# Patient Record
Sex: MALE | HISPANIC OR LATINO | Employment: FULL TIME | URBAN - METROPOLITAN AREA
[De-identification: names, ages, dates, MRNs, and addresses within clinical notes are randomized per-mention and may not be internally consistent; named-entity substitution may affect disease eponyms.]

---

## 2019-10-14 ENCOUNTER — OCCUPATIONAL HEALTH (OUTPATIENT)
Dept: URGENT CARE | Facility: CLINIC | Age: 34
End: 2019-10-14

## 2019-10-14 VITALS
TEMPERATURE: 97 F | BODY MASS INDEX: 28.45 KG/M2 | RESPIRATION RATE: 16 BRPM | DIASTOLIC BLOOD PRESSURE: 76 MMHG | WEIGHT: 177 LBS | HEART RATE: 69 BPM | HEIGHT: 66 IN | OXYGEN SATURATION: 100 % | SYSTOLIC BLOOD PRESSURE: 135 MMHG

## 2019-10-14 DIAGNOSIS — Z02.1 PRE-EMPLOYMENT DRUG SCREENING: ICD-10-CM

## 2019-10-14 DIAGNOSIS — Z13.39 ALCOHOL SCREENING: Primary | ICD-10-CM

## 2019-10-14 LAB
CTP QC/QA: YES
POC 10 PANEL DRUG SCREEN: NEGATIVE
POC BREATH ALCOHOL: NEGATIVE

## 2019-10-14 PROCEDURE — 99499 UNLISTED E&M SERVICE: CPT | Mod: S$GLB,,, | Performed by: NURSE PRACTITIONER

## 2019-10-14 PROCEDURE — 80305 DRUG TEST PRSMV DIR OPT OBS: CPT | Mod: QW,S$GLB,, | Performed by: NURSE PRACTITIONER

## 2019-10-14 PROCEDURE — 82075 POCT ALCOHOL BREATH TEST: ICD-10-PCS | Mod: S$GLB,,, | Performed by: NURSE PRACTITIONER

## 2019-10-14 PROCEDURE — 82075 ASSAY OF BREATH ETHANOL: CPT | Mod: S$GLB,,, | Performed by: NURSE PRACTITIONER

## 2019-10-14 PROCEDURE — 80305 POCT RAPID DRUG SCREEN 10 PANEL: ICD-10-PCS | Mod: QW,S$GLB,, | Performed by: NURSE PRACTITIONER

## 2019-10-14 PROCEDURE — 99499 PR PHYSICAL - BASIC NON DOT/CDL: ICD-10-PCS | Mod: S$GLB,,, | Performed by: NURSE PRACTITIONER

## 2019-10-14 NOTE — PROGRESS NOTES
Name: Armand Duran   Age: 33 y.o.  Address: [address] [place] [address] rd  : 1985    Phone: [phone]   Company: [id number]       Date of Exam: 10/14/2019    Type of Exam: [x] Pre-Placement  []  Periodic  []  Return to Work     Patient History:  History reviewed. No pertinent past medical history.  Family History   Problem Relation Age of Onset    No Known Problems Mother     Liver disease Father      History reviewed. No pertinent surgical history.      No flowsheet data found.   .    WARNING; PURSUANT TO [place]-RS 23:1208.1, Armand Duran UNDERSTANDS THAT THE FAILURE TO ANSWER TRUTHFULLY ANY OF THE ABOVE QUESTIONS MAY RESULT IN FORFEITURE OF ANY RIGHT THE PATIENT OR ANY DEPENDENTS MAY HAVE TO WORKERS' COMPENSATION BENEFITS, INCLUDING MEDICAL TREATMENT AND EXPENSES.  Patient hereby authorizes any and all information gained from this examination to be transmitted to the above company.  Patient has not withheld information or given any false data and understands that any recommendations to the above named company are based on the limited medical studies that the provider has been authorized to perform.  Patient understands that this is a limited examination and does not eliminate all possibilities of physical impairment, disease, or capabilities.  Patient will not hold the examining physician responsible for any physical condition, which may or may not be exposed by this limited examination.    Patient consent: yes    Job title: Data Unavailable    No outpatient encounter medications on file as of 10/14/2019.     No facility-administered encounter medications on file as of 10/14/2019.         Allergic to any medication? has No Known Allergies.    Any auto accidents or injuries? No    Has a doctor ever treated you for any back, neck, knee, or other orthopedic problems? No.    Social History     Tobacco Use   Smoking Status Never Smoker   Smokeless Tobacco Never Used        Social History     Substance and Sexual Activity   Alcohol Use Not Currently       Last menstrual period: No LMP for male patient.    Vitals:    10/14/19 1204   BP: 135/76   Pulse: 69   Resp: 16   Temp: 96.7 °F (35.9 °C)       Urine:   No results found for: GLUCOSEUR    No results found for: PROTEINUR    No results found for: SPECGRAV    Other:         No exam data present    HPI  ROS        Physical Exam      Ancillary:  No orders of the defined types were placed in this encounter.      Strength & Flexibility Score:      LIFT/STRENGTH/CARRYING RESULTS:      Immunizations:    There is no immunization history on file for this patient.    Details of abnormal or significant findings on physical exam:      Examined by: Carmencita Heredia

## 2019-10-14 NOTE — PATIENT INSTRUCTIONS
Please follow up with your Primary care provider within 2-5 days if your signs and symptoms have not resolved or worsen.     If your condition worsens or fails to improve we recommend that you receive another evaluation at the emergency room immediately or contact your primary medical clinic to discuss your concerns.    You must understand that you have received an Urgent Care treatment only and that you may be released before all of your medical problems are known or treated.   You, the patient, will arrange for follow up care as instructed.